# Patient Record
Sex: MALE | Race: BLACK OR AFRICAN AMERICAN
[De-identification: names, ages, dates, MRNs, and addresses within clinical notes are randomized per-mention and may not be internally consistent; named-entity substitution may affect disease eponyms.]

---

## 2019-04-12 ENCOUNTER — HOSPITAL ENCOUNTER (OUTPATIENT)
Dept: HOSPITAL 84 - D.OPS | Age: 29
Discharge: HOME | End: 2019-04-12
Attending: SURGERY
Payer: COMMERCIAL

## 2019-04-12 VITALS — BODY MASS INDEX: 20.7 KG/M2 | HEIGHT: 76 IN | WEIGHT: 170 LBS | BODY MASS INDEX: 20.7 KG/M2

## 2019-04-12 VITALS — DIASTOLIC BLOOD PRESSURE: 78 MMHG | SYSTOLIC BLOOD PRESSURE: 116 MMHG

## 2019-04-12 DIAGNOSIS — L72.0: Primary | ICD-10-CM

## 2019-04-12 DIAGNOSIS — Z01.812: ICD-10-CM

## 2019-04-12 NOTE — NUR
DC'D BACK TO [facility] VIA FACILITY VEHICLE. TRANSPORTED TO
VEHICLE ACCOMPANIED BY OFFICERS. STABLE AT TIME OF DC.

## 2019-04-12 NOTE — NUR
REC'D FROM [facility] ACCOMPANIED BY [facility] OFFICERS. DRESSING CDI TO LEFT
DELTOID AREA. FL TRAY BROUGHT TO PT.

## 2019-04-18 NOTE — OP
PATIENT NAME:  SAL STEPHENS                            MEDICAL RECORD: [id number]
:89                                             LOCATION:D.OPS          
                                                         ADMISSION DATE:        
SURGEON:  ALEX ARTIS MD            
 
 
DATE OF OPERATION:  2019
 
PREOPERATIVE DIAGNOSIS:  Lipoma of the left shoulder.
 
POSTOPERATIVE DIAGNOSIS:  Sebaceous cyst of the left shoulder.
 
SURGEON:  Alex Artis MD
 
ASSISTANT:  None.
 
BLOOD LOSS:  Minimal.
 
ANESTHESIA:  General.
 
COMPLICATIONS:  None.
 
The risks, possible complications and alternatives to the procedure were
explained to the patient.  He elects to proceed.
 
The patient has no left upper extremity numbness, paresthesias or weakness.
 
OPERATIVE COURSE:  The patient was conveyed to the operating room electively on
2019.  General anesthesia was induced by the anesthesia staff.  The left
shoulder was sterilely prepped and draped.  A transverse incision was
accomplished over the mass, which was easily felt.  I dissected down to the
mass, which was a sebaceous cyst.  Keratin debris was expressed.  The cyst was
then excised in its entirety sharply.  Meticulous hemostasis was achieved with
electrocautery.  The wound was closed with multiple interrupted intracuticular
3-0 Vicryl sutures.  Benzoin and Steri-Strips were applied.
 
The patient was then extubated and conveyed to post-anesthesia care unit where
he was in stable condition.  There is no need for the patient to follow up with
me in the office unless he develops a complication related to this operative
procedure.  If necessary, I can see him on rounds out at the residential during my GI
clinic day.  I think that nonnarcotic analgesia should control his pain.
 
TRANSINT:[id number] Voice Confirmation ID: [id number] DOCUMENT ID: [id number]
                                           
                                           ALEX ARTIS MD            
 
 
 
Electronically Signed by ALEX ARTIS on 19 at 1642
 
CC: [id number]                                                  [id number]
DICTATION DATE: 19 1000     :     19 1304      [facility] 
                                                                      19
[place] [id number] [place] [id number]